# Patient Record
Sex: MALE | Race: WHITE | Employment: FULL TIME | URBAN - METROPOLITAN AREA
[De-identification: names, ages, dates, MRNs, and addresses within clinical notes are randomized per-mention and may not be internally consistent; named-entity substitution may affect disease eponyms.]

---

## 2018-09-18 ENCOUNTER — APPOINTMENT (OUTPATIENT)
Dept: CT IMAGING | Facility: HOSPITAL | Age: 63
DRG: 185 | End: 2018-09-18
Attending: EMERGENCY MEDICINE
Payer: COMMERCIAL

## 2018-09-18 ENCOUNTER — APPOINTMENT (OUTPATIENT)
Dept: GENERAL RADIOLOGY | Facility: HOSPITAL | Age: 63
DRG: 185 | End: 2018-09-18
Attending: EMERGENCY MEDICINE
Payer: COMMERCIAL

## 2018-09-18 ENCOUNTER — HOSPITAL ENCOUNTER (INPATIENT)
Facility: HOSPITAL | Age: 63
LOS: 4 days | Discharge: HOME | DRG: 185 | End: 2018-09-22
Attending: EMERGENCY MEDICINE | Admitting: HOSPITALIST
Payer: COMMERCIAL

## 2018-09-18 DIAGNOSIS — S22.41XA CLOSED FRACTURE OF MULTIPLE RIBS OF RIGHT SIDE, INITIAL ENCOUNTER: Primary | ICD-10-CM

## 2018-09-18 DIAGNOSIS — S20.211A RIB CONTUSION, RIGHT, INITIAL ENCOUNTER: ICD-10-CM

## 2018-09-18 DIAGNOSIS — S22.5XXA FLAIL CHEST, INITIAL ENCOUNTER FOR CLOSED FRACTURE: ICD-10-CM

## 2018-09-18 LAB
ANION GAP SERPL CALC-SCNC: 9 MMOL/L (ref 0–18)
BASOPHILS # BLD: 0.1 K/UL (ref 0–0.2)
BASOPHILS NFR BLD: 1 %
BUN SERPL-MCNC: 17 MG/DL (ref 8–20)
BUN/CREAT SERPL: 18.9 (ref 10–20)
CALCIUM SERPL-MCNC: 9.2 MG/DL (ref 8.5–10.5)
CHLORIDE SERPL-SCNC: 102 MMOL/L (ref 95–110)
CO2 SERPL-SCNC: 28 MMOL/L (ref 22–32)
CREAT SERPL-MCNC: 0.9 MG/DL (ref 0.5–1.5)
EOSINOPHIL # BLD: 0 K/UL (ref 0–0.7)
EOSINOPHIL NFR BLD: 0 %
ERYTHROCYTE [DISTWIDTH] IN BLOOD BY AUTOMATED COUNT: 13.7 % (ref 11–15)
GLUCOSE SERPL-MCNC: 129 MG/DL (ref 70–99)
HCT VFR BLD AUTO: 47.5 % (ref 41–52)
HGB BLD-MCNC: 15.7 G/DL (ref 13.5–17.5)
LYMPHOCYTES # BLD: 1.3 K/UL (ref 1–4)
LYMPHOCYTES NFR BLD: 13 %
MCH RBC QN AUTO: 31.2 PG (ref 27–32)
MCHC RBC AUTO-ENTMCNC: 33.1 G/DL (ref 32–37)
MCV RBC AUTO: 94.3 FL (ref 80–100)
MONOCYTES # BLD: 1.4 K/UL (ref 0–1)
MONOCYTES NFR BLD: 14 %
NEUTROPHILS # BLD AUTO: 7.2 K/UL (ref 1.8–7.7)
NEUTROPHILS NFR BLD: 72 %
OSMOLALITY UR CALC.SUM OF ELEC: 291 MOSM/KG (ref 275–295)
PLATELET # BLD AUTO: 225 K/UL (ref 140–400)
PMV BLD AUTO: 8 FL (ref 7.4–10.3)
POTASSIUM SERPL-SCNC: 4.2 MMOL/L (ref 3.3–5.1)
RBC # BLD AUTO: 5.03 M/UL (ref 4.5–5.9)
SODIUM SERPL-SCNC: 139 MMOL/L (ref 136–144)
WBC # BLD AUTO: 9.9 K/UL (ref 4–11)

## 2018-09-18 PROCEDURE — 70450 CT HEAD/BRAIN W/O DYE: CPT | Performed by: EMERGENCY MEDICINE

## 2018-09-18 PROCEDURE — 74177 CT ABD & PELVIS W/CONTRAST: CPT | Performed by: EMERGENCY MEDICINE

## 2018-09-18 PROCEDURE — 71260 CT THORAX DX C+: CPT | Performed by: EMERGENCY MEDICINE

## 2018-09-18 PROCEDURE — 71101 X-RAY EXAM UNILAT RIBS/CHEST: CPT | Performed by: EMERGENCY MEDICINE

## 2018-09-18 PROCEDURE — 99223 1ST HOSP IP/OBS HIGH 75: CPT | Performed by: HOSPITALIST

## 2018-09-18 PROCEDURE — 99222 1ST HOSP IP/OBS MODERATE 55: CPT | Performed by: INTERNAL MEDICINE

## 2018-09-18 RX ORDER — ENALAPRIL MALEATE 2.5 MG/1
TABLET ORAL DAILY
Status: ON HOLD | COMMUNITY
End: 2018-09-18 | Stop reason: CLARIF

## 2018-09-18 RX ORDER — HYDROCODONE BITARTRATE AND ACETAMINOPHEN 5; 325 MG/1; MG/1
1-2 TABLET ORAL EVERY 4 HOURS PRN
Qty: 10 TABLET | Refills: 0 | Status: SHIPPED | OUTPATIENT
Start: 2018-09-18 | End: 2018-09-21

## 2018-09-18 RX ORDER — HYDROMORPHONE HYDROCHLORIDE 1 MG/ML
0.5 INJECTION, SOLUTION INTRAMUSCULAR; INTRAVENOUS; SUBCUTANEOUS
Status: DISCONTINUED | OUTPATIENT
Start: 2018-09-18 | End: 2018-09-21 | Stop reason: DRUGHIGH

## 2018-09-18 RX ORDER — METOCLOPRAMIDE HYDROCHLORIDE 5 MG/ML
10 INJECTION INTRAMUSCULAR; INTRAVENOUS EVERY 8 HOURS PRN
Status: DISCONTINUED | OUTPATIENT
Start: 2018-09-18 | End: 2018-09-22

## 2018-09-18 RX ORDER — HYDROCODONE BITARTRATE AND ACETAMINOPHEN 5; 325 MG/1; MG/1
1 TABLET ORAL EVERY 6 HOURS PRN
Status: DISCONTINUED | OUTPATIENT
Start: 2018-09-18 | End: 2018-09-21 | Stop reason: DRUGHIGH

## 2018-09-18 RX ORDER — ONDANSETRON 2 MG/ML
4 INJECTION INTRAMUSCULAR; INTRAVENOUS EVERY 4 HOURS PRN
Status: DISCONTINUED | OUTPATIENT
Start: 2018-09-18 | End: 2018-09-22

## 2018-09-18 RX ORDER — ENALAPRIL MALEATE 20 MG/1
20 TABLET ORAL DAILY
Status: DISCONTINUED | OUTPATIENT
Start: 2018-09-18 | End: 2018-09-22

## 2018-09-18 RX ORDER — 0.9 % SODIUM CHLORIDE 0.9 %
VIAL (ML) INJECTION
Status: COMPLETED
Start: 2018-09-18 | End: 2018-09-18

## 2018-09-18 RX ORDER — ENALAPRIL MALEATE 20 MG/1
20 TABLET ORAL DAILY
COMMUNITY

## 2018-09-18 RX ORDER — MORPHINE SULFATE 4 MG/ML
8 INJECTION, SOLUTION INTRAMUSCULAR; INTRAVENOUS ONCE
Status: COMPLETED | OUTPATIENT
Start: 2018-09-18 | End: 2018-09-18

## 2018-09-18 RX ORDER — ONDANSETRON 2 MG/ML
4 INJECTION INTRAMUSCULAR; INTRAVENOUS EVERY 6 HOURS PRN
Status: DISCONTINUED | OUTPATIENT
Start: 2018-09-18 | End: 2018-09-22

## 2018-09-18 RX ORDER — MORPHINE SULFATE 4 MG/ML
4 INJECTION, SOLUTION INTRAMUSCULAR; INTRAVENOUS
Status: DISCONTINUED | OUTPATIENT
Start: 2018-09-18 | End: 2018-09-21 | Stop reason: DRUGHIGH

## 2018-09-18 RX ORDER — POLYETHYLENE GLYCOL 3350 17 G/17G
17 POWDER, FOR SOLUTION ORAL DAILY PRN
Status: DISCONTINUED | OUTPATIENT
Start: 2018-09-18 | End: 2018-09-22

## 2018-09-18 RX ORDER — MORPHINE SULFATE 4 MG/ML
4 INJECTION, SOLUTION INTRAMUSCULAR; INTRAVENOUS ONCE
Status: COMPLETED | OUTPATIENT
Start: 2018-09-18 | End: 2018-09-18

## 2018-09-18 RX ORDER — HYDROMORPHONE HYDROCHLORIDE 1 MG/ML
0.3 INJECTION, SOLUTION INTRAMUSCULAR; INTRAVENOUS; SUBCUTANEOUS
Status: DISCONTINUED | OUTPATIENT
Start: 2018-09-18 | End: 2018-09-21 | Stop reason: DRUGHIGH

## 2018-09-18 RX ORDER — BISACODYL 10 MG
10 SUPPOSITORY, RECTAL RECTAL
Status: DISCONTINUED | OUTPATIENT
Start: 2018-09-18 | End: 2018-09-22

## 2018-09-18 RX ORDER — KETOROLAC TROMETHAMINE 30 MG/ML
30 INJECTION, SOLUTION INTRAMUSCULAR; INTRAVENOUS ONCE
Status: COMPLETED | OUTPATIENT
Start: 2018-09-18 | End: 2018-09-18

## 2018-09-18 RX ORDER — MULTIVITAMIN
1 TABLET ORAL DAILY
COMMUNITY

## 2018-09-18 RX ORDER — SODIUM PHOSPHATE, DIBASIC AND SODIUM PHOSPHATE, MONOBASIC 7; 19 G/133ML; G/133ML
1 ENEMA RECTAL ONCE AS NEEDED
Status: DISCONTINUED | OUTPATIENT
Start: 2018-09-18 | End: 2018-09-22

## 2018-09-18 RX ORDER — SODIUM CHLORIDE 0.9 % (FLUSH) 0.9 %
3 SYRINGE (ML) INJECTION AS NEEDED
Status: DISCONTINUED | OUTPATIENT
Start: 2018-09-18 | End: 2018-09-22

## 2018-09-18 RX ORDER — HYDROCODONE BITARTRATE AND ACETAMINOPHEN 5; 325 MG/1; MG/1
2 TABLET ORAL EVERY 6 HOURS PRN
Status: DISCONTINUED | OUTPATIENT
Start: 2018-09-18 | End: 2018-09-21 | Stop reason: DRUGHIGH

## 2018-09-18 RX ORDER — DIPHENOXYLATE HYDROCHLORIDE AND ATROPINE SULFATE 2.5; .025 MG/1; MG/1
1 TABLET ORAL DAILY
Status: DISCONTINUED | OUTPATIENT
Start: 2018-09-18 | End: 2018-09-22

## 2018-09-18 RX ORDER — DOCUSATE SODIUM 100 MG/1
100 CAPSULE, LIQUID FILLED ORAL 2 TIMES DAILY
Status: DISCONTINUED | OUTPATIENT
Start: 2018-09-18 | End: 2018-09-22
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
## 2018-09-18 NOTE — CONSULTS
[facility]    Consult Note    Date:  9/18/2018  Date of Admission:  9/18/2018      Chief Complaint:   Cameron Delarosa is a(n) 59year old male with rib fractures.     HPI:   The patient was in his usual state of health until 6 PM on Saturda thyroid disease. No rash. Muscles and joints unremarkable. No weight loss no weight gain. Physical Exam:   Vital Signs:  Blood pressure 134/85, pulse 63, temperature 98 °F (36.7 °C), temperature source Oral, resp.  rate 18, height 5' 6\" (1.676 m), weigh hans, concerning for underlying intramuscular hematoma and/or muscle injury. 7. Uncomplicated distal colonic diverticulosis. 8. Lesser incidental findings as above. Assessment/Plan:   1.   Multiple right-sided rib fractures with flail chest

## 2018-09-18 NOTE — PLAN OF CARE
Patient Centered Care    • Patient preferences are identified and integrated in the patient's plan of care Progressing        Patient/Family Goals    • Patient/Family Long Term Goal: Remain out of hospital Progressing    • Patient/Family Short Term Goal: L

## 2018-09-18 NOTE — H&P
[facility]:  [facility]    1955 MRN [id number]   Location [facility]5W Attending Arnav Cosme, [address] [facility] Day # 0 PCP No primary care provider on file.      Date:   exertion  Cardiovascular: negative for chest pain and dyspnea  Gastrointestinal: negative for constipation, diarrhea, melena, nausea and vomiting  Genitourinary:negative for dysuria and hematuria  Integument/breast: negative for rash  Hematologic/lymphatic  09/18/2018    K 4.2 09/18/2018     09/18/2018    CO2 28 09/18/2018     (H) 09/18/2018    CA 9.2 09/18/2018       Ct Brain Or Head (63664)    Result Date: 9/18/2018  CONCLUSION:  1.  Negative for depressed calvarial fracture, coup/contrec findings. 2. Findings were described by [facility].      Dictated by (CST): Iggy Samuel MD on 9/18/2018 at 5:48     Approved by (CST): José Miguel White MD on 9/18/2018 at 5:50                Assessment/Plan:     Closed fracture of m

## 2018-09-18 NOTE — ED NOTES
Call from [facility]: Acute rib fractures posterior medially 3-7 ribs. 4th-6 ribs fractured also laterally. Small right hemorrhagic effusion, small amount of edema to the sq tissue to the rt side.  Head CT is negative. ermd notified

## 2018-09-19 ENCOUNTER — APPOINTMENT (OUTPATIENT)
Dept: GENERAL RADIOLOGY | Facility: HOSPITAL | Age: 63
DRG: 185 | End: 2018-09-19
Attending: INTERNAL MEDICINE
Payer: COMMERCIAL

## 2018-09-19 ENCOUNTER — APPOINTMENT (OUTPATIENT)
Dept: CV DIAGNOSTICS | Facility: HOSPITAL | Age: 63
DRG: 185 | End: 2018-09-19
Attending: HOSPITALIST
Payer: COMMERCIAL

## 2018-09-19 LAB — TSH SERPL-ACNC: 4.36 UIU/ML (ref 0.45–5.33)

## 2018-09-19 PROCEDURE — 93306 TTE W/DOPPLER COMPLETE: CPT | Performed by: HOSPITALIST

## 2018-09-19 PROCEDURE — 71045 X-RAY EXAM CHEST 1 VIEW: CPT | Performed by: INTERNAL MEDICINE

## 2018-09-19 PROCEDURE — 99232 SBSQ HOSP IP/OBS MODERATE 35: CPT | Performed by: INTERNAL MEDICINE

## 2018-09-19 PROCEDURE — 99233 SBSQ HOSP IP/OBS HIGH 50: CPT | Performed by: HOSPITALIST

## 2018-09-19 RX ORDER — OXYCODONE HYDROCHLORIDE 5 MG/1
5 TABLET ORAL EVERY 4 HOURS PRN
Status: DISCONTINUED | OUTPATIENT
Start: 2018-09-19 | End: 2018-09-21 | Stop reason: DRUGHIGH

## 2018-09-19 RX ORDER — MORPHINE SULFATE 15 MG/1
15 TABLET ORAL EVERY 4 HOURS PRN
Status: DISCONTINUED | OUTPATIENT
Start: 2018-09-19 | End: 2018-09-21 | Stop reason: DRUGHIGH

## 2018-09-19 RX ORDER — LIDOCAINE 50 MG/G
1 PATCH TOPICAL EVERY 24 HOURS
Status: DISCONTINUED | OUTPATIENT
Start: 2018-09-19 | End: 2018-09-21 | Stop reason: DRUGHIGH

## 2018-09-19 RX ORDER — MORPHINE SULFATE 15 MG/1
15 TABLET, FILM COATED, EXTENDED RELEASE ORAL EVERY 12 HOURS SCHEDULED
Status: DISCONTINUED | OUTPATIENT
Start: 2018-09-19 | End: 2018-09-21

## 2018-09-19 RX ORDER — DILTIAZEM HYDROCHLORIDE 60 MG/1
60 TABLET, FILM COATED ORAL AS NEEDED
Status: DISCONTINUED | OUTPATIENT
Start: 2018-09-19 | End: 2018-09-22

## 2018-09-19 RX ORDER — CYCLOBENZAPRINE HCL 10 MG
10 TABLET ORAL 3 TIMES DAILY PRN
Status: DISCONTINUED | OUTPATIENT
Start: 2018-09-19 | End: 2018-09-22

## 2018-09-19 RX ORDER — LIDOCAINE 50 MG/G
1 PATCH TOPICAL DAILY
Status: DISCONTINUED | OUTPATIENT
Start: 2018-09-19 | End: 2018-09-21 | Stop reason: DRUGHIGH

## 2018-09-19 NOTE — PLAN OF CARE
Problem: Patient/Family Goals  Goal: Patient/Family Long Term Goal  Patient's Long Term Goal: pain management     Interventions:  - pain medication.   - preventing pain to escalate   - See additional Care Plan goals for specific interventions  Outcome: Prog

## 2018-09-19 NOTE — PROGRESS NOTES
[facility]    Progress Note      Assessment and Plan:   1. Multiple right-sided rib fractures with flail chest– the patient is stabilizing clinically but has ongoing significant pain control. Small associated effusion. No pneumothorax.
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
## 2018-09-19 NOTE — RESPIRATORY THERAPY NOTE
KENNETH ASSESSMENT:    Pt does not have a previous diagnosis of KENNETH. Pt does not routinely use a CPAP device at home. This pt is suspected to be at high risk for KENNETH and sleep lab packet was provided to patient for outpatient follow-up.     RCP recommends patie

## 2018-09-19 NOTE — CONSULTS
[facility] AND [facility]  [facility] Cardiology Consult Note    Leah Hylton Patient Status:  Inpatient    1955 MRN [id number]   Location [facility]5W Attending Wade Randle Day # 1 PCP No primary care provider on file.      59 at 9/19/2018 1300  Gross per 24 hour   Intake 1216 ml   Output —   Net 1216 ml       Physical Exam:     General: Alert and oriented x 3. No apparent distress. No respiratory or constitutional distress. HEENT: Normocephalic, anicteric sclera, neck supple.

## 2018-09-20 LAB
ANION GAP SERPL CALC-SCNC: 6 MMOL/L (ref 0–18)
BASOPHILS # BLD: 0 K/UL (ref 0–0.2)
BASOPHILS NFR BLD: 0 %
BUN SERPL-MCNC: 18 MG/DL (ref 8–20)
BUN/CREAT SERPL: 22.2 (ref 10–20)
CALCIUM SERPL-MCNC: 8.6 MG/DL (ref 8.5–10.5)
CHLORIDE SERPL-SCNC: 99 MMOL/L (ref 95–110)
CO2 SERPL-SCNC: 29 MMOL/L (ref 22–32)
CREAT SERPL-MCNC: 0.81 MG/DL (ref 0.5–1.5)
EOSINOPHIL # BLD: 0.2 K/UL (ref 0–0.7)
EOSINOPHIL NFR BLD: 2 %
ERYTHROCYTE [DISTWIDTH] IN BLOOD BY AUTOMATED COUNT: 13.6 % (ref 11–15)
GLUCOSE SERPL-MCNC: 109 MG/DL (ref 70–99)
HCT VFR BLD AUTO: 40.6 % (ref 41–52)
HGB BLD-MCNC: 13.5 G/DL (ref 13.5–17.5)
LYMPHOCYTES # BLD: 1.4 K/UL (ref 1–4)
LYMPHOCYTES NFR BLD: 16 %
MAGNESIUM SERPL-MCNC: 2 MG/DL (ref 1.8–2.5)
MCH RBC QN AUTO: 31.5 PG (ref 27–32)
MCHC RBC AUTO-ENTMCNC: 33.3 G/DL (ref 32–37)
MCV RBC AUTO: 94.6 FL (ref 80–100)
MONOCYTES # BLD: 1.2 K/UL (ref 0–1)
MONOCYTES NFR BLD: 14 %
NEUTROPHILS # BLD AUTO: 5.8 K/UL (ref 1.8–7.7)
NEUTROPHILS NFR BLD: 68 %
OSMOLALITY UR CALC.SUM OF ELEC: 280 MOSM/KG (ref 275–295)
PLATELET # BLD AUTO: 221 K/UL (ref 140–400)
PMV BLD AUTO: 8.1 FL (ref 7.4–10.3)
POTASSIUM SERPL-SCNC: 4.8 MMOL/L (ref 3.3–5.1)
RBC # BLD AUTO: 4.3 M/UL (ref 4.5–5.9)
SODIUM SERPL-SCNC: 134 MMOL/L (ref 136–144)
WBC # BLD AUTO: 8.6 K/UL (ref 4–11)

## 2018-09-20 PROCEDURE — 99232 SBSQ HOSP IP/OBS MODERATE 35: CPT | Performed by: INTERNAL MEDICINE

## 2018-09-20 PROCEDURE — 99233 SBSQ HOSP IP/OBS HIGH 50: CPT | Performed by: HOSPITALIST

## 2018-09-20 RX ORDER — 0.9 % SODIUM CHLORIDE 0.9 %
VIAL (ML) INJECTION
Status: COMPLETED
Start: 2018-09-20 | End: 2018-09-20

## 2018-09-20 RX ORDER — METOPROLOL SUCCINATE 25 MG/1
25 TABLET, EXTENDED RELEASE ORAL
Status: DISCONTINUED | OUTPATIENT
Start: 2018-09-21 | End: 2018-09-22

## 2018-09-20 NOTE — PROGRESS NOTES
[facility]    Progress Note    [facility] Patient Status:  Inpatient    1955 MRN [id number]   Location [facility]5W Attending Wade Randle Day # 2 PCP No primary care provider on file.         Ju Varela  09/20/2018    CREATSERUM 0.81 09/20/2018    BUN 18 09/20/2018     (L) 09/20/2018    K 4.8 09/20/2018    CL 99 09/20/2018    CO2 29 09/20/2018     (H) 09/20/2018    CA 8.6 09/20/2018    TSH 4.36 09/18/2018    MG 2.0 09/20/2018       Xr

## 2018-09-20 NOTE — PROGRESS NOTES
[facility]    Progress Note    Nakulcristobal Krueger Patient Status:  Inpatient    1955 MRN [id number]   Location [facility]5W Attending Wade Randle Day # 2 PCP No primary care provider on file.         Mike Kauffman 09/20/2018     09/20/2018    CREATSERUM 0.81 09/20/2018    BUN 18 09/20/2018     (L) 09/20/2018    K 4.8 09/20/2018    CL 99 09/20/2018    CO2 29 09/20/2018     (H) 09/20/2018    CA 8.6 09/20/2018    TSH 4.36 09/18/2018    MG 2.0 09/20/

## 2018-09-20 NOTE — PLAN OF CARE
Problem: Patient/Family Goals  Goal: Patient/Family Long Term Goal  Patient's Long Term Goal: pain management     Interventions:  - pain medication.   - preventing pain to escalate   - See additional Care Plan goals for specific interventions   Outcome: Pro discharged on this medication. Cardiology signed off on patient.   Goal: Absence of cardiac arrhythmias or at baseline  INTERVENTIONS:  - Continuous cardiac monitoring, monitor vital signs, obtain 12 lead EKG if indicated  - Evaluate effectiveness of antiar

## 2018-09-21 ENCOUNTER — APPOINTMENT (OUTPATIENT)
Dept: GENERAL RADIOLOGY | Facility: HOSPITAL | Age: 63
DRG: 185 | End: 2018-09-21
Attending: HOSPITALIST
Payer: COMMERCIAL

## 2018-09-21 PROCEDURE — 73000 X-RAY EXAM OF COLLAR BONE: CPT | Performed by: HOSPITALIST

## 2018-09-21 PROCEDURE — 99232 SBSQ HOSP IP/OBS MODERATE 35: CPT | Performed by: INTERNAL MEDICINE

## 2018-09-21 PROCEDURE — 71046 X-RAY EXAM CHEST 2 VIEWS: CPT | Performed by: HOSPITALIST

## 2018-09-21 PROCEDURE — 99233 SBSQ HOSP IP/OBS HIGH 50: CPT | Performed by: HOSPITALIST

## 2018-09-21 RX ORDER — OXYCODONE AND ACETAMINOPHEN 10; 325 MG/1; MG/1
1 TABLET ORAL EVERY 4 HOURS PRN
Status: DISCONTINUED | OUTPATIENT
Start: 2018-09-21 | End: 2018-09-22

## 2018-09-21 RX ORDER — KETOROLAC TROMETHAMINE 10 MG/1
10 TABLET, FILM COATED ORAL EVERY 6 HOURS
Qty: 15 TABLET | Refills: 0 | Status: SHIPPED | OUTPATIENT
Start: 2018-09-21

## 2018-09-21 RX ORDER — MORPHINE SULFATE 30 MG/1
30 TABLET, FILM COATED, EXTENDED RELEASE ORAL EVERY 12 HOURS SCHEDULED
Status: DISCONTINUED | OUTPATIENT
Start: 2018-09-21 | End: 2018-09-21 | Stop reason: DRUGHIGH

## 2018-09-21 RX ORDER — KETOROLAC TROMETHAMINE 30 MG/ML
30 INJECTION, SOLUTION INTRAMUSCULAR; INTRAVENOUS ONCE
Status: COMPLETED | OUTPATIENT
Start: 2018-09-21 | End: 2018-09-21

## 2018-09-21 RX ORDER — OXYCODONE AND ACETAMINOPHEN 10; 325 MG/1; MG/1
1 TABLET ORAL EVERY 4 HOURS PRN
Qty: 90 TABLET | Refills: 0 | Status: SHIPPED | OUTPATIENT
Start: 2018-09-21 | End: 2018-09-21

## 2018-09-21 RX ORDER — METOPROLOL SUCCINATE 25 MG/1
25 TABLET, EXTENDED RELEASE ORAL
Qty: 30 TABLET | Refills: 3 | Status: SHIPPED | OUTPATIENT
Start: 2018-09-22

## 2018-09-21 RX ORDER — GABAPENTIN 300 MG/1
300 CAPSULE ORAL 3 TIMES DAILY
Status: DISCONTINUED | OUTPATIENT
Start: 2018-09-21 | End: 2018-09-22

## 2018-09-21 RX ORDER — OXYCODONE AND ACETAMINOPHEN 10; 325 MG/1; MG/1
1 TABLET ORAL EVERY 4 HOURS PRN
Qty: 90 TABLET | Refills: 0 | Status: SHIPPED | OUTPATIENT
Start: 2018-09-21

## 2018-09-21 RX ORDER — KETOROLAC TROMETHAMINE 10 MG/1
10 TABLET, FILM COATED ORAL EVERY 6 HOURS
Status: DISCONTINUED | OUTPATIENT
Start: 2018-09-22 | End: 2018-09-22

## 2018-09-21 RX ORDER — KETOROLAC TROMETHAMINE 10 MG/1
30 TABLET, FILM COATED ORAL EVERY 6 HOURS
Status: DISCONTINUED | OUTPATIENT
Start: 2018-09-21 | End: 2018-09-21

## 2018-09-21 NOTE — PROGRESS NOTES
[facility]    Progress Note    Tricia Hart Patient Status:  Inpatient    1955 MRN [id number]   Location [facility]5W Attending Wade Randle Day # 3 PCP No primary care provider on file.         Shad Espinoza Ekg 12-lead    Result Date: 9/19/2018  ECG Report  Interpretation  --------------------------              Donnie Blend. Elex Pallas, MD  9/21/2018

## 2018-09-21 NOTE — PLAN OF CARE
Problem: Patient/Family Goals  Goal: Patient/Family Long Term Goal  Patient's Long Term Goal: pain management     Interventions:  - pain medication.   - preventing pain to escalate   - See additional Care Plan goals for specific interventions   Outcome: Pro control medications as ordered  - Initiate emergency measures for life threatening arrhythmias  - Monitor electrolytes and administer replacement therapy as ordered  Outcome: Progressing      Problem: PAIN - ADULT  Goal: Verbalizes/displays adequate comfor
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
## 2018-09-21 NOTE — CONSULTS
[facility] AND [facility]  [facility] Cardiology Consult Note    Tivis Cea Patient Status:  Inpatient    1955 MRN [id number]   Location [facility]5W Attending Wade Randle Day # 3 PCP No primary care provider on file.      59 use drugs.     Objective:   Temp: 98.6 °F (37 °C)  Pulse: 77  Resp: 18  BP: 135/89    Intake/Output:     Intake/Output Summary (Last 24 hours) at 9/21/2018 1116  Last data filed at 9/21/2018 0600  Gross per 24 hour   Intake 220 ml   Output —   Net 220 ml

## 2018-09-21 NOTE — PROGRESS NOTES
[facility]  Report of Consultation    Boris Krueger Patient Status:  Inpatient    1955 MRN [id number]   Location [facility]5W Attending Wade Randle Day # 3 PCP No primary care provider on file.      Roberto Carlos lidocaine (LIDODERM) 5 % 1 patch, 1 patch, Transdermal, Q24H  •  lidocaine (LIDODERM) 5 % 1 patch, 1 patch, Transdermal, Daily  •  morphINE Sulfate IR (MSIR) tab 15 mg, 15 mg, Oral, Q4H PRN  •  Cyclobenzaprine HCl (cyclobenzaprine) tab 10 mg, 10 mg, Oral, externally grossly within normal limits, no unusual discharge or rhinorrhea   Throat: lips grossly within normal limits by visual exam externally  Neck: supple, trachea midline, no obvious JVD  Chest: S1, S2, RRR, no obvious visual deformity  Respiratory: ribs of right side     Rib contusion, right, initial encounter     Closed fracture of multiple ribs of right side, initial encounter     Flail chest, initial encounter for closed fracture        Recommendations:   Toradol 30mg Q6 IV one time  Toradol 10mg T

## 2018-09-21 NOTE — PROGRESS NOTES
[facility]    Progress Note    Dois Bosworth Patient Status:  Inpatient    1955 MRN [id number]   Location [facility]5W Attending Wade Randle Day # 3 PCP No primary care provider on file.         Yulissa Pour controlled               Results:     Lab Results   Component Value Date    WBC 8.6 09/20/2018    HGB 13.5 09/20/2018    HCT 40.6 (L) 09/20/2018     09/20/2018    CREATSERUM 0.81 09/20/2018    BUN 18 09/20/2018     (L) 09/20/2018    K 4.8 09/2

## 2018-09-22 VITALS
HEIGHT: 66 IN | TEMPERATURE: 98 F | SYSTOLIC BLOOD PRESSURE: 115 MMHG | DIASTOLIC BLOOD PRESSURE: 77 MMHG | HEART RATE: 59 BPM | OXYGEN SATURATION: 96 % | RESPIRATION RATE: 18 BRPM | BODY MASS INDEX: 34.55 KG/M2 | WEIGHT: 215 LBS

## 2018-09-22 LAB
ANION GAP SERPL CALC-SCNC: 6 MMOL/L (ref 0–18)
BASOPHILS # BLD: 0.1 K/UL (ref 0–0.2)
BASOPHILS NFR BLD: 1 %
BUN SERPL-MCNC: 17 MG/DL (ref 8–20)
BUN/CREAT SERPL: 19.1 (ref 10–20)
CALCIUM SERPL-MCNC: 8.3 MG/DL (ref 8.5–10.5)
CHLORIDE SERPL-SCNC: 98 MMOL/L (ref 95–110)
CO2 SERPL-SCNC: 28 MMOL/L (ref 22–32)
CREAT SERPL-MCNC: 0.89 MG/DL (ref 0.5–1.5)
EOSINOPHIL # BLD: 0.3 K/UL (ref 0–0.7)
EOSINOPHIL NFR BLD: 4 %
ERYTHROCYTE [DISTWIDTH] IN BLOOD BY AUTOMATED COUNT: 13.3 % (ref 11–15)
GLUCOSE SERPL-MCNC: 99 MG/DL (ref 70–99)
HCT VFR BLD AUTO: 38.6 % (ref 41–52)
HGB BLD-MCNC: 13.2 G/DL (ref 13.5–17.5)
LYMPHOCYTES # BLD: 1.8 K/UL (ref 1–4)
LYMPHOCYTES NFR BLD: 23 %
MAGNESIUM SERPL-MCNC: 2 MG/DL (ref 1.8–2.5)
MCH RBC QN AUTO: 32.3 PG (ref 27–32)
MCHC RBC AUTO-ENTMCNC: 34.3 G/DL (ref 32–37)
MCV RBC AUTO: 94.2 FL (ref 80–100)
MONOCYTES # BLD: 1.3 K/UL (ref 0–1)
MONOCYTES NFR BLD: 17 %
NEUTROPHILS # BLD AUTO: 4.3 K/UL (ref 1.8–7.7)
NEUTROPHILS NFR BLD: 55 %
OSMOLALITY UR CALC.SUM OF ELEC: 276 MOSM/KG (ref 275–295)
PLATELET # BLD AUTO: 229 K/UL (ref 140–400)
PMV BLD AUTO: 8 FL (ref 7.4–10.3)
POTASSIUM SERPL-SCNC: 4.5 MMOL/L (ref 3.3–5.1)
RBC # BLD AUTO: 4.1 M/UL (ref 4.5–5.9)
SODIUM SERPL-SCNC: 132 MMOL/L (ref 136–144)
WBC # BLD AUTO: 7.8 K/UL (ref 4–11)

## 2018-09-22 PROCEDURE — 99239 HOSP IP/OBS DSCHRG MGMT >30: CPT | Performed by: HOSPITALIST

## 2018-09-22 PROCEDURE — 99232 SBSQ HOSP IP/OBS MODERATE 35: CPT | Performed by: INTERNAL MEDICINE

## 2018-09-22 RX ORDER — CYCLOBENZAPRINE HCL 10 MG
10 TABLET ORAL 3 TIMES DAILY PRN
Qty: 20 TABLET | Refills: 0 | Status: SHIPPED | OUTPATIENT
Start: 2018-09-22

## 2018-09-22 RX ORDER — PSEUDOEPHEDRINE HCL 30 MG
100 TABLET ORAL 2 TIMES DAILY PRN
Qty: 30 CAPSULE | Refills: 0 | Status: SHIPPED | OUTPATIENT
Start: 2018-09-22

## 2018-09-22 NOTE — PROGRESS NOTES
[facility]  Report of Consultation    Nicholas Grade Patient Status:  Inpatient    1955 MRN [id number]   Location [facility]5W Attending Wade Randle Day # 4 PCP No primary care provider on file.      Roberto Carlos ondansetron HCl (ZOFRAN) injection 4 mg, 4 mg, Intravenous, Q4H PRN  •  Normal Saline Flush 0.9 % injection 3 mL, 3 mL, Intravenous, PRN  •  ondansetron HCl (ZOFRAN) injection 4 mg, 4 mg, Intravenous, Q6H PRN  •  Metoclopramide HCl (REGLAN) injection 10 mg VIEWS), RIGHT (CPT=71101), 9/18/2018, 2:18. INDICATIONS:  Shortness of breath and chest pain today post fall x4 days ago. History of hypertension. Right-sided rib fractures.       TECHNIQUE:    2 AP views     FINDINGS:          CARDIAC/VASC:           N injections, and further testing. Risks and benefits of all options were discussed at length to patients satisfaction during a comprehensive interactive discussion. All questions were answered during extended questions and answer session.  Patient agreeable

## 2018-09-22 NOTE — DISCHARGE SUMMARY
> 30 min spent on dc  Dc summary #[id number]    Hospital Discharge Diagnoses: rib fx    Lace+ Score: 52  59-90 High Risk  29-58 Medium Risk  0-28   Low Risk. TCM Follow-Up Recommendation:  LACE 29-58:  Moderate Risk of readmission after discharge from the

## 2018-09-22 NOTE — PLAN OF CARE
Problem: Patient/Family Goals  Goal: Patient/Family Long Term Goal  Patient's Long Term Goal: pain management     Interventions:  - pain medication. - preventing pain to escalate   - See additional Care Plan goals for specific interventions   Outcome:  Karyn effectiveness of antiarrhythmic and heart rate control medications as ordered  - Initiate emergency measures for life threatening arrhythmias  - Monitor electrolytes and administer replacement therapy as ordered  Outcome: Adequate for Discharge      Proble

## 2018-09-22 NOTE — PROGRESS NOTES
[facility]    Progress Note      Assessment and Plan:   1. Multiple right-sided rib fractures with flail chest– the patient is stabilizing clinically and the pain is vastly improved.   The patient is anxious to go home.     Recommendations:

## 2018-09-22 NOTE — DISCHARGE PLANNING
Pt is a/o, denies pain, denies SOB. To be discharged today to home. Went over follow up instructions. Went over medications and side effects with patient. D/C IV, site CDI, D/C tele. Assured patient had belongings from home.  Questions from patient and or f

## 2018-09-24 NOTE — PAYOR COMM NOTE
--------------  CONTINUED STAY REVIEW    Payor: [address] #:  [id number]  Authorization Number: [id number]    Admit date: 9/18/18  Admit time: [address]  DISCHARGED 9/22    Admitting Physician: Sugey Larson MD  Att detected.    AIRWAYS: The tracheobronchial tree is without central mass or obstructing lesion. There is no gross evidence of barotrauma. MEDIASTINUM/SAYDA: No mass or lymphadenopathy. Negative for mediastinal hematoma. No evidence for pneumomediastinum.   C Pelvic organs appropriate for patient age. Coarse prostatic calcifications are present. Deep pelvic calcifications likely represent phleboliths.    VASCULATURE:   Trace atherosclerotic vascular calcifications of the abdominal aorta are observed.  No aneurys           Impression:  CONCLUSION:   1. At the posteromedial aspect of the costovertebral junctions of the right 3rd through 7th ribs, there are nondisplaced fractures.  There are additional superimposed nondisplaced right lateral 4th, 5th, and 6th ribs, re

## 2018-09-24 NOTE — PAYOR COMM NOTE
--------------  DISCHARGE REVIEW    Payor: [address] #:  [id number]  Authorization Number: [id number]    Admit date: 9/18/18  Admit time:  [address] [facility]  Discharge Date: 9/22/2018  4:44 PM     Admitting Physician: Klarissa Ortiz   HGB 13.2 09/22/2018     HCT 38.6 09/22/2018      09/22/2018     CREATSERUM 0.89 09/22/2018     BUN 17 09/22/2018      09/22/2018     K 4.5 09/22/2018     CL 98 09/22/2018     CO2 28 09/22/2018     GLU 99 09/22/2018     CA 8.3 09/22/2018     Assessment and Plan:              Closed fracture of multiple ribs of right side, initial encounter - from fall non displaced but pain continues  - change to iv morphine q 3 hours   - IS   - pulm consult appreciated   - pulse ox  - add norco   -pt states n Psychiatric/Behavioral: Negative for decreased concentration. The patient is nervous/anxious.               Objective:   Blood pressure 130/75, pulse 61, temperature 98.1 °F (36.7 °C), temperature source Oral, resp.  rate 20, height 5' 6\" (1.676 m), weight CONCLUSION:  1. Negative portable chest. 2. Changes at the distal left clavicle which appear chronic but are incompletely visualized. Correlate clinically. If indicated, additional views of the left clavicle could be obtained.      Dictated by (CST): Crystal Houston Skin: Skin is warm and dry. He is not diaphoretic.    Psychiatric: His behavior is normal. His mood appears anxious.             Assessment and Plan:        Closed fracture of multiple ribs of right side, initial encounter - from fall  Severe pain from rib

## 2018-09-25 NOTE — DISCHARGE SUMMARY
[facility]    PATIENT'S NAME: [facility]   ATTENDING PHYSICIAN: Yu Randle MD   PATIENT ACCOUNT#:   [id number]    LOCATION:  [address] [place] [address] #:   [id number]       YOB: 1955  ADMISSION DATE:       09/18/2 Code.    DISCHARGE MEDICATIONS:    1. Oxycodone IR 10/325 one tablet every 4 hours as needed. Watch for drowsiness. No alcohol. 2.   Toradol 10 mg every 6 hours. Only take for 2 days. 3.   Enalapril 20 mg daily. 4.   Metoprolol ER 25 mg daily.   5.

## 2018-10-02 NOTE — PAYOR COMM NOTE
--------------  DISCHARGE REVIEW    Payor: [address] #:  [id number]  Authorization Number: [id number]    Admit date: 9/18/18  Admit time:  [address] [facility]  Discharge Date: 9/22/2018  4:44 PM     Admitting Physician: Geronimo Carrera EXAMINATION:    VITAL SIGNS:  On discharge, temperature is 98.2, pulse is 59, respiratory rate 18, 158/77, 96%. LUNGS:  Crackles at both bases. HEART:  Normal S1, S2. No S3.  ABDOMEN:  Soft. EXTREMITIES:  Without significant calf tenderness.   Doron Colon

## 2019-08-30 ENCOUNTER — HOSPITAL ENCOUNTER (OUTPATIENT)
Dept: GENERAL RADIOLOGY | Facility: HOSPITAL | Age: 64
Discharge: HOME | End: 2019-08-30
Attending: ORTHOPAEDIC SURGERY
Payer: COMMERCIAL

## 2019-08-30 DIAGNOSIS — R52 PAIN: ICD-10-CM

## 2019-08-30 PROCEDURE — 73503 X-RAY EXAM HIP UNI 4/> VIEWS: CPT | Performed by: ORTHOPAEDIC SURGERY

## 2019-08-30 PROCEDURE — 73502 X-RAY EXAM HIP UNI 2-3 VIEWS: CPT | Performed by: ORTHOPAEDIC SURGERY

## 2019-10-04 ENCOUNTER — HOSPITAL ENCOUNTER (OUTPATIENT)
Dept: GENERAL RADIOLOGY | Facility: HOSPITAL | Age: 64
Discharge: HOME | End: 2019-10-04
Attending: ORTHOPAEDIC SURGERY
Payer: COMMERCIAL

## 2019-10-04 DIAGNOSIS — M25.551 RIGHT HIP PAIN: ICD-10-CM

## 2019-10-04 PROCEDURE — 73502 X-RAY EXAM HIP UNI 2-3 VIEWS: CPT | Performed by: ORTHOPAEDIC SURGERY

## 2024-05-05 NOTE — PROGRESS NOTES
[facility]    Progress Note      Assessment and Plan:   1. Multiple right-sided rib fractures with flail chest– the patient is stabilizing clinically and the pain is slightly better.   The patient is okay to be discharged home with pain con Yes